# Patient Record
Sex: MALE
[De-identification: names, ages, dates, MRNs, and addresses within clinical notes are randomized per-mention and may not be internally consistent; named-entity substitution may affect disease eponyms.]

---

## 2022-05-10 PROBLEM — Z00.00 ENCOUNTER FOR PREVENTIVE HEALTH EXAMINATION: Status: ACTIVE | Noted: 2022-05-10

## 2022-05-11 ENCOUNTER — APPOINTMENT (OUTPATIENT)
Dept: ORTHOPEDIC SURGERY | Facility: CLINIC | Age: 52
End: 2022-05-11
Payer: COMMERCIAL

## 2022-05-11 PROCEDURE — 99213 OFFICE O/P EST LOW 20 MIN: CPT

## 2022-05-11 NOTE — HISTORY OF PRESENT ILLNESS
[de-identified] : Patient presents for f/u R knee s/p TKA 10/18/21 and SANDI 12/13/21. Patient states his knee is doing well, notes he only has some minimal pain if he really pushes it. Otherwise, has returned to work and full activities without issue.

## 2022-05-11 NOTE — DISCUSSION/SUMMARY
[de-identified] : I reviewed patient's prior radiographs and discussed his condition and treatment course.  Resume activities as tolerated.  Follow up in 5 months with XRs.  Patient voiced understanding and agreement with the plan.\par

## 2022-05-11 NOTE — PHYSICAL EXAM
[Right] : right knee [NL (0)] : extension 0 degrees [5___] : hamstring 5[unfilled]/5 [] : patient ambulates without assistive device [[id number]] : flexion 115 degrees

## 2022-10-12 ENCOUNTER — APPOINTMENT (OUTPATIENT)
Dept: ORTHOPEDIC SURGERY | Facility: CLINIC | Age: 52
End: 2022-10-12

## 2022-10-12 DIAGNOSIS — M17.11 UNILATERAL PRIMARY OSTEOARTHRITIS, RIGHT KNEE: ICD-10-CM

## 2022-10-12 PROCEDURE — 73562 X-RAY EXAM OF KNEE 3: CPT | Mod: RT

## 2022-10-12 PROCEDURE — 99213 OFFICE O/P EST LOW 20 MIN: CPT

## 2022-10-12 NOTE — DISCUSSION/SUMMARY
[de-identified] : I reviewed patient's radiographs and discussed his condition and treatment course.  Resume activities as tolerated.  Follow up in 1 year with XRs.  Patient voiced understanding and agreement with the plan.\par

## 2022-10-12 NOTE — PHYSICAL EXAM
[NL (0)] : extension 0 degrees [5___] : hamstring 5[unfilled]/5 [] : no tenderness [Right] : right knee [AP] : anteroposterior [Lateral] : lateral [[place]] : skyline [Components well fixed, in good position] : Components well fixed, in good position [[email or web address]] : flexion 120 degrees

## 2022-10-12 NOTE — HISTORY OF PRESENT ILLNESS
[de-identified] : Since last visit, states that he is feeling good with no complaints of pain or discomfort.